# Patient Record
Sex: FEMALE
[De-identification: names, ages, dates, MRNs, and addresses within clinical notes are randomized per-mention and may not be internally consistent; named-entity substitution may affect disease eponyms.]

---

## 2022-08-17 ENCOUNTER — NON-APPOINTMENT (OUTPATIENT)
Age: 32
End: 2022-08-17

## 2023-03-21 PROBLEM — Z00.00 ENCOUNTER FOR PREVENTIVE HEALTH EXAMINATION: Status: ACTIVE | Noted: 2023-03-21

## 2023-03-22 ENCOUNTER — APPOINTMENT (OUTPATIENT)
Dept: OTOLARYNGOLOGY | Facility: CLINIC | Age: 33
End: 2023-03-22
Payer: COMMERCIAL

## 2023-03-22 VITALS — WEIGHT: 170 LBS | HEIGHT: 66 IN | BODY MASS INDEX: 27.32 KG/M2

## 2023-03-22 DIAGNOSIS — Z78.9 OTHER SPECIFIED HEALTH STATUS: ICD-10-CM

## 2023-03-22 DIAGNOSIS — Z83.3 FAMILY HISTORY OF DIABETES MELLITUS: ICD-10-CM

## 2023-03-22 DIAGNOSIS — Z82.49 FAMILY HISTORY OF ISCHEMIC HEART DISEASE AND OTHER DISEASES OF THE CIRCULATORY SYSTEM: ICD-10-CM

## 2023-03-22 DIAGNOSIS — Z82.5 FAMILY HISTORY OF ASTHMA AND OTHER CHRONIC LOWER RESPIRATORY DISEASES: ICD-10-CM

## 2023-03-22 DIAGNOSIS — Z87.09 PERSONAL HISTORY OF OTHER DISEASES OF THE RESPIRATORY SYSTEM: ICD-10-CM

## 2023-03-22 PROCEDURE — 31231 NASAL ENDOSCOPY DX: CPT

## 2023-03-22 PROCEDURE — 99204 OFFICE O/P NEW MOD 45 MIN: CPT | Mod: 25

## 2023-03-22 RX ORDER — BUDESONIDE 1 MG/2ML
INHALANT ORAL
Refills: 0 | Status: ACTIVE | COMMUNITY

## 2023-03-22 RX ORDER — GENTAMICIN SULFATE 3 MG/ML
SOLUTION OPHTHALMIC
Refills: 0 | Status: ACTIVE | COMMUNITY

## 2023-03-22 NOTE — CONSULT LETTER
[Dear  ___] : Dear  [unfilled], [Consult Letter:] : I had the pleasure of evaluating your patient, [unfilled]. [Please see my note below.] : Please see my note below. [Consult Closing:] : Thank you very much for allowing me to participate in the care of this patient.  If you have any questions, please do not hesitate to contact me. [Sincerely,] : Sincerely, [FreeTextEntry3] : Izzy Spears MD\par [facility]\par Otolaryngology – Head & Neck Surgery\par [facility]\par \par \par Department of Otolaryngology\par [facility]\par \par Office Tel: [phone]\par

## 2023-03-22 NOTE — HISTORY OF PRESENT ILLNESS
[de-identified] : ELE MONTESINOS is a 32 year old patient with a history of chronic sinusitis and sinus surgery.  She had sinus surgery in January 2021.  She had nasal polyps and allergic fungal sinusitis.  She did well for a while after surgery but then had recurrent symptoms this past summer.  She had been doing her topical rinses less frequently.  She does feel better she takes antibiotics and steroids.  She just finished a course of steroids which helped again.  She has nasal congestion, nasal obstruction, nasal drainage.  She does not have sinus pain or pressure.  She had been seeing [facility] her [facility] disease.  They had discussed revision sinus surgery.  She had a CT scan in March 2023 which we reviewed.  She is on nasal saline irrigations with gentamicin and budesonide.  She said recent culture grew Klebsiella\par \par She has a history of allergies.  Immunotherapy was recommended.  She is going to start this.  She was also evaluated for Dupixent.\par She had endoscopic sinus surgery in January 2021\par She does not smoke\par She does not have reflux\par Sleep study- April 2021.  BLUE was 1.4.  Her lowest oxygen saturation was 92%\par \par CT 3/14/23- prior sinus surgery. large maxillary sinus cyst/polyps with mucosal disease,opacification of the posterior ethmoids and sphenoid sinuses, obstruction of the SER and sphenoid sinus ostia. DNS\par \par her  CT scans and prior records were reviewed.

## 2023-03-22 NOTE — ASSESSMENT
[FreeTextEntry1] : History of chronic sinusitis, allergies, nasal polyps, sinus surgery.  She has had recurrent symptoms.  On exam she has large cyst/polyp in the maxillary sinuses with allergic mucin, obstruction and inflammation of the posterior ethmoid sinuses and sphenoethmoidal recesses, and a deviated nasal septum.  She is undergoing evaluation for immunotherapy and Dupixent.  She is a candidate for revision endoscopic sinus surgery.\par \par Plan\par -Findings and management options were discussed with the patient.\par -Continue nasal saline irrigations with budesonide gentamicin\par -Continue allergy work-up.  She is considering Dupixent\par -We discussed revision surgery.  She has received excellent care with Dr. Cho.  However, due to insurance issues, she is unable to have surgery with him.  She could consider trying Dupixent first.  However, she would benefit from revision endoscopic image guided sinus surgery with nasal polypectomy (bilateral maxillary sinuses, posterior ethmoid sinuses, and sphenoid sinuses), possible septoplasty and bilateral inferior turbinate reduction.  She would like to proceed.  I am going to have her speak with my surgery scheduler about arranging it.  I will review the procedure with her again prior to surgery as well as the risk/benefits.  She is aware of the risk that she has had sinus surgery in the past\par -I will have her speak with my surgery scheduler about arranging procedure.  I will see her back prior to surgery if she has any concerns or worsening symptoms

## 2023-06-30 ENCOUNTER — APPOINTMENT (OUTPATIENT)
Dept: OTOLARYNGOLOGY | Facility: CLINIC | Age: 33
End: 2023-06-30
Payer: COMMERCIAL

## 2023-06-30 DIAGNOSIS — Z91.09 OTHER ALLERGY STATUS, OTHER THAN TO DRUGS AND BIOLOGICAL SUBSTANCES: ICD-10-CM

## 2023-06-30 DIAGNOSIS — J33.9 NASAL POLYP, UNSPECIFIED: ICD-10-CM

## 2023-06-30 DIAGNOSIS — J32.4 CHRONIC PANSINUSITIS: ICD-10-CM

## 2023-06-30 DIAGNOSIS — J31.0 CHRONIC RHINITIS: ICD-10-CM

## 2023-06-30 DIAGNOSIS — J34.2 DEVIATED NASAL SEPTUM: ICD-10-CM

## 2023-06-30 PROCEDURE — 99213 OFFICE O/P EST LOW 20 MIN: CPT | Mod: 25

## 2023-06-30 PROCEDURE — 31231 NASAL ENDOSCOPY DX: CPT

## 2023-06-30 NOTE — CONSULT LETTER
[Dear  ___] : Dear  [unfilled], [Courtesy Letter:] : I had the pleasure of seeing your patient, [unfilled], in my office today. [Please see my note below.] : Please see my note below. [Consult Closing:] : Thank you very much for allowing me to participate in the care of this patient.  If you have any questions, please do not hesitate to contact me. [Sincerely,] : Sincerely, [FreeTextEntry3] : Izzy Spears MD\par [facility]\par Otolaryngology – Head & Neck Surgery\par [facility]\par \par \par Department of Otolaryngology\par [facility]\par \par Office Tel: [phone]\par

## 2023-06-30 NOTE — HISTORY OF PRESENT ILLNESS
[de-identified] : Is a historyNICOLE YAMEL is a 32 year old patient here for follow-up chronic rhinosinusitis, nasal polyps, and sinus surgery.  She is scheduled for revision surgery next week.  However, since her last visit, she has started Dupixent and feels a lot better.  She is breathing a lot better and has only occasional sinus pressure.  She has not had drainage.  She is also using the nasal saline irrigations with topical steroids.\par \par ENT history\par She has a history of allergies. Immunotherapy was recommended. She is going to start this. She was also evaluated for Dupixent.\par She had endoscopic sinus surgery in January 2021\par She does not smoke\par She does not have reflux\par Sleep study- April 2021. BLUE was 1.4. Her lowest oxygen saturation was 92%\par \par CT 3/14/23- prior sinus surgery. large maxillary sinus cyst/polyps with mucosal disease,opacification of the posterior ethmoids and sphenoid sinuses, obstruction of the SER and sphenoid sinus ostia. DNS

## 2023-06-30 NOTE — ASSESSMENT
[FreeTextEntry1] : She has a history of chronic sinusitis, allergies, nasal polyps, sinus surgery.  She has started Dupixent and is feeling better.  Her exam shows significant improvement.  There were no polyps or drainage on exam.  The posterior ethmoid sinuses looked a lot better.\par \par Plan\par -Findings and management options were discussed with the patient.\par -Continue nasal saline irrigations with budesonide bid\par -Continue allergy management and Dupixent\par -We discussed proceeding with the revision endoscopic sinus surgery with possible septoplasty and inferior turbinate submucous resection.  I reviewed in detail the risk/benefits of the procedure.  However, she is feeling better and exam does show improvement.  The posterior ethmoid sinuses had some scarring but were otherwise clear.  It is possible she should still have sphenoid sinus disease.  At this point, she is going to hold off on surgery.  I am going to see her back in approximately 1 month.  I will see how she is feeling.  We will discuss doing a follow-up CT scan.  We could also consider in office procedures if needed.\par -She should call me and return earlier if any concerns or worsening symptoms

## 2023-06-30 NOTE — REASON FOR VISIT
[Subsequent Evaluation] : a subsequent evaluation for [FreeTextEntry2] : nasal polyps and chronic sinusitis

## 2023-07-05 ENCOUNTER — APPOINTMENT (OUTPATIENT)
Age: 33
End: 2023-07-05

## 2023-08-04 ENCOUNTER — APPOINTMENT (OUTPATIENT)
Dept: PHYSICAL MEDICINE AND REHAB | Facility: CLINIC | Age: 33
End: 2023-08-04

## 2023-08-15 ENCOUNTER — APPOINTMENT (OUTPATIENT)
Dept: PHYSICAL MEDICINE AND REHAB | Facility: CLINIC | Age: 33
End: 2023-08-15
Payer: COMMERCIAL

## 2023-08-15 VITALS
HEART RATE: 77 BPM | RESPIRATION RATE: 18 BRPM | DIASTOLIC BLOOD PRESSURE: 67 MMHG | BODY MASS INDEX: 28.45 KG/M2 | WEIGHT: 177 LBS | SYSTOLIC BLOOD PRESSURE: 112 MMHG | HEIGHT: 66 IN

## 2023-08-15 PROCEDURE — 99204 OFFICE O/P NEW MOD 45 MIN: CPT

## 2023-08-15 NOTE — REVIEW OF SYSTEMS
[Patient Intake Form Reviewed] : Patient intake form was reviewed [Fever] : no fever [Chills] : no chills [Lower Ext Edema] : no lower extremity edema [Joint Pain] : joint pain [Joint Stiffness] : joint stiffness [Muscle Pain] : muscle pain

## 2023-08-15 NOTE — PHYSICAL EXAM
[FreeTextEntry1] : PHYSICAL EXAM : OBJECTIVE   GENERAL : Awake ,alert and oriented to time place and person  HEAD : normocephalic and atraumatic  NECK : supple ,no tracheal deviation ,no thyroid enlargement noted with swallowing EYES : sclera and conjunctiva normal no redness,intact extraocular movements  ENT  : ears and nose normal in appearance -hearing adequate  PULMONARY: effort normal. No respiratory distress. breathing regular. No wheezes  LYMPH : No swelling in limbs, capillary return within normal range  CVS : warm extremities,no palpitations,not short of breath, no visible jugular venous distention PSYCH : mood and affect normal ,good eye contact ,normal attention  ABDOMEN : no visible distension ,  NEUROLOGICAL:cranial nerves intact,muscle tone normal,gait and balance safe except where noted below  SKIN : warm and dry No rash detected over specific body areas examined  MUSCULOSKELETAL: normal muscle bulk, no focal bony tenderness /posture normal except where specified below + tinels R elbow  weak PAD and DAB R  numb pinky R  no change in ZAIRE sensation

## 2023-08-15 NOTE — CONSULT LETTER
[FreeTextEntry1] : Dear Dr. LORENA AVERY  ,   I had the pleasure of evaluating your patient, ELE MONTESINOS .  Thank you very much for allowing me to participate in the care of this patient. If you have any questions, please do not hesitate to contact me.   Sincerely,  Greyson Donato MD   ABPMR Board Certified in Physical Medicine and Rehabilitation Certified Fellow of AANEM (Neuromuscular and Electrodiagnostic Medicine) Subspecialty certified in Sports Medicine (ABPMR)   of Physical Medicine and Rehabilitation [facility] [employer] [facility]

## 2023-08-15 NOTE — ASSESSMENT
[FreeTextEntry1] :  PLAN AND RECOMMENDATIONS :  We discussed differential diagnosis and clinical impression agree with EMG rule out cubital tunnel   Recommend .symptomatic care and support  medications   imaging   referral to  hydrotherapy /heat / cold for pain  continue  relative rest and avoidance of painful activity where possible   increasing activity as discussed   return for follow up

## 2023-08-15 NOTE — HISTORY OF PRESENT ILLNESS
[FreeTextEntry1] : Ms. ELE MONTESINOS is a very pleasant 33 year female RHD  who seen for evaluation of R 4 th 5 th digit numbness and pain that has been ongoing for a couple of years since Covid  without any specific injury or inciting event. The pain is located primarily medial hand as well as elbow  intermittent in nature and described as dull . The pain is rated as 5/10 during today's visit, and ranges from 3-8/10. The patient's symptoms are aggravated by using hand using computer using phone or leaning on arm and alleviated by rest sleeping with elbow brace . The patient denies any night pain, LUE numbness/tingling, weakness, or bowel/bladder dysfunction. The patient has no other complaints at this time. 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

## 2023-08-24 ENCOUNTER — APPOINTMENT (OUTPATIENT)
Dept: PHYSICAL MEDICINE AND REHAB | Facility: CLINIC | Age: 33
End: 2023-08-24
Payer: COMMERCIAL

## 2023-08-24 DIAGNOSIS — G56.21 LESION OF ULNAR NERVE, RIGHT UPPER LIMB: ICD-10-CM

## 2023-08-24 DIAGNOSIS — R20.0 ANESTHESIA OF SKIN: ICD-10-CM

## 2023-08-24 PROCEDURE — 95913 NRV CNDJ TEST 13/> STUDIES: CPT

## 2023-08-24 PROCEDURE — 95886 MUSC TEST DONE W/N TEST COMP: CPT

## 2023-10-11 ENCOUNTER — APPOINTMENT (OUTPATIENT)
Dept: OBGYN | Facility: CLINIC | Age: 33
End: 2023-10-11
Payer: COMMERCIAL

## 2023-10-11 VITALS
SYSTOLIC BLOOD PRESSURE: 133 MMHG | DIASTOLIC BLOOD PRESSURE: 93 MMHG | OXYGEN SATURATION: 98 % | HEIGHT: 66 IN | WEIGHT: 175 LBS | BODY MASS INDEX: 28.12 KG/M2 | HEART RATE: 90 BPM

## 2023-10-11 DIAGNOSIS — Z01.419 ENCOUNTER FOR GYNECOLOGICAL EXAMINATION (GENERAL) (ROUTINE) W/OUT ABNORMAL FINDINGS: ICD-10-CM

## 2023-10-11 DIAGNOSIS — Z11.3 ENCOUNTER FOR SCREENING FOR INFECTIONS WITH A PREDOMINANTLY SEXUAL MODE OF TRANSMISSION: ICD-10-CM

## 2023-10-11 PROCEDURE — 36415 COLL VENOUS BLD VENIPUNCTURE: CPT

## 2023-10-11 PROCEDURE — 99385 PREV VISIT NEW AGE 18-39: CPT

## 2023-10-12 LAB
C TRACH RRNA SPEC QL NAA+PROBE: NOT DETECTED
HBV SURFACE AG SER QL: NONREACTIVE
HIV1+2 AB SPEC QL IA.RAPID: NONREACTIVE
HPV HIGH+LOW RISK DNA PNL CVX: NOT DETECTED
N GONORRHOEA RRNA SPEC QL NAA+PROBE: NOT DETECTED
SOURCE TP AMPLIFICATION: NORMAL

## 2023-10-13 ENCOUNTER — TRANSCRIPTION ENCOUNTER (OUTPATIENT)
Age: 33
End: 2023-10-13

## 2023-10-13 LAB
BACTERIA UR CULT: NORMAL
T PALLIDUM AB SER QL IA: NEGATIVE

## 2023-10-16 LAB
HSV 1 IGG TYPE-SPECIFIC AB: 57.3 INDEX
HSV 2 IGG TYPE-SPECIFIC AB: <0.9 INDEX

## 2023-10-17 LAB — CYTOLOGY CVX/VAG DOC THIN PREP: NORMAL

## 2023-10-23 ENCOUNTER — APPOINTMENT (OUTPATIENT)
Dept: OBGYN | Facility: CLINIC | Age: 33
End: 2023-10-23

## 2023-11-09 ENCOUNTER — APPOINTMENT (OUTPATIENT)
Dept: GASTROENTEROLOGY | Facility: CLINIC | Age: 33
End: 2023-11-09
Payer: COMMERCIAL

## 2023-11-09 VITALS
RESPIRATION RATE: 16 BRPM | DIASTOLIC BLOOD PRESSURE: 70 MMHG | BODY MASS INDEX: 27.97 KG/M2 | TEMPERATURE: 96.8 F | HEIGHT: 66 IN | HEART RATE: 80 BPM | WEIGHT: 174 LBS | OXYGEN SATURATION: 97 % | SYSTOLIC BLOOD PRESSURE: 110 MMHG

## 2023-11-09 DIAGNOSIS — K59.00 CONSTIPATION, UNSPECIFIED: ICD-10-CM

## 2023-11-09 DIAGNOSIS — Z23 ENCOUNTER FOR IMMUNIZATION: ICD-10-CM

## 2023-11-09 PROCEDURE — 90686 IIV4 VACC NO PRSV 0.5 ML IM: CPT

## 2023-11-09 PROCEDURE — 99204 OFFICE O/P NEW MOD 45 MIN: CPT | Mod: 25

## 2023-11-09 PROCEDURE — G0008: CPT

## 2023-12-07 ENCOUNTER — NON-APPOINTMENT (OUTPATIENT)
Age: 33
End: 2023-12-07

## 2023-12-07 ENCOUNTER — APPOINTMENT (OUTPATIENT)
Dept: COLORECTAL SURGERY | Facility: CLINIC | Age: 33
End: 2023-12-07

## 2023-12-12 ENCOUNTER — NON-APPOINTMENT (OUTPATIENT)
Age: 33
End: 2023-12-12

## 2023-12-13 ENCOUNTER — APPOINTMENT (OUTPATIENT)
Dept: DERMATOLOGY | Facility: CLINIC | Age: 33
End: 2023-12-13
Payer: COMMERCIAL

## 2023-12-13 DIAGNOSIS — L82.1 OTHER SEBORRHEIC KERATOSIS: ICD-10-CM

## 2023-12-13 DIAGNOSIS — Q82.8 OTHER SPECIFIED CONGENITAL MALFORMATIONS OF SKIN: ICD-10-CM

## 2023-12-13 DIAGNOSIS — L60.8 OTHER NAIL DISORDERS: ICD-10-CM

## 2023-12-13 PROCEDURE — 99204 OFFICE O/P NEW MOD 45 MIN: CPT

## 2023-12-13 NOTE — HISTORY OF PRESENT ILLNESS
[FreeTextEntry1] : NPV- KP, DPN, Dark strip on nail [de-identified] : Debora Sheriff is a 32yo F presenting for keratosis pilaris and dermatosis papulosa nigra.

## 2023-12-13 NOTE — HISTORY OF PRESENT ILLNESS
[FreeTextEntry1] : NPV- KP, DPN, Dark strip on nail [de-identified] : Debora Sheriff is a 34yo F presenting for keratosis pilaris and dermatosis papulosa nigra.

## 2023-12-13 NOTE — PHYSICAL EXAM
[FreeTextEntry3] : - stuck on appearing keratotic hyperpigmented papules on cheeks and temples - 5mm greyish-brown band of melanonychia (about 30-35% of nail plate). Pigmentation is fairly even. Grey dots granules are not seen. negative Villalba sign (she has periungual hyperpigmentation in all nails). No triangle sign.  - follicular based papules in oval configurations on shins and right posterior upper arm.

## 2023-12-13 NOTE — ASSESSMENT
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 Initially grew, reportedly stable for 10 years.    Based on clinical presentation and dermoscopy findings, melanoma specific signs were not seen. Although the history is reassuring that she has not noticed growth or change in 10 years, given the size approaching 40% of the nail plate, I recommended a nail matrix biopsy to r/o subungual melanoma. We discussed the surgical risks and benefits of performing a nail biopsy.  She would like to avoid doing the nail biopsy if possible. We discussed that the next best option would be to take photos and measurements and follow the lesion closely with regular follow up. She is in agreement with this plan. She is aware to contact the clinic for follow up if she were to notice any changes such as growth, changes in color, new pigmentation involving the skin of the proximal nailfold, or new nail plate dystrophy.   - f/u 12 weeks  #Dermatosis papulosa nigra Patient was reassured of the benign nature of these findings. No further treatment needed at this time. If any lesion changes or becomes symptomatic I recommend follow-up  # Lichen spinulosus- shins, R upper arm Clinically, favor LS, given the round circular configurations. Other ddx: keratosis pilaris, lichen nitidus, less likely follicular MF.  Discussed that lichen spinulosus is a benign cutaneous disorder characterized by sudden onset of localized circumscribed plaques composed of follicular hyperkeratotic papules. This conditions can self-resolve. Can be triggered by skin abrasion and I suspect it may be exacerbated by the regular dermabrasions she is receiving.   -I recommended gentle skin care, with bland fragrance-free moisturizer -avoid mechanical trauma and exfoliation -start a gentle chemical exfoliator such as Amlactin or Cerave SA cream daily to the area -We discussed that if these lesions spread and become more widespread, she should follow up with me in clinic for a biopsy to r/o other less common but serious skin conditions.

## 2023-12-31 PROBLEM — Z11.3 ENCOUNTER FOR SCREENING EXAMINATION FOR SEXUALLY TRANSMITTED DISEASE: Status: ACTIVE | Noted: 2023-10-11

## 2024-01-01 NOTE — PROCEDURE
[de-identified] : EMG /NERVE CONDUCTION STUDY performed today without complication  Tabulated data, wave forms , conclusions and recommendations are attached and in the procedure report.  Please refer to the scanned study attached to this encounter  Full history and focused clinical exam performed prior to the examination and documented in report Offered, feeding was successful

## 2024-01-18 ENCOUNTER — APPOINTMENT (OUTPATIENT)
Dept: COLORECTAL SURGERY | Facility: CLINIC | Age: 34
End: 2024-01-18
Payer: COMMERCIAL

## 2024-01-18 VITALS
WEIGHT: 177 LBS | SYSTOLIC BLOOD PRESSURE: 134 MMHG | HEIGHT: 66 IN | DIASTOLIC BLOOD PRESSURE: 79 MMHG | TEMPERATURE: 97.7 F | BODY MASS INDEX: 28.45 KG/M2 | HEART RATE: 90 BPM

## 2024-01-18 DIAGNOSIS — Z82.3 FAMILY HISTORY OF STROKE: ICD-10-CM

## 2024-01-18 DIAGNOSIS — K64.4 RESIDUAL HEMORRHOIDAL SKIN TAGS: ICD-10-CM

## 2024-01-18 DIAGNOSIS — Z82.49 FAMILY HISTORY OF ISCHEMIC HEART DISEASE AND OTHER DISEASES OF THE CIRCULATORY SYSTEM: ICD-10-CM

## 2024-01-18 PROCEDURE — 99203 OFFICE O/P NEW LOW 30 MIN: CPT | Mod: 25

## 2024-01-18 PROCEDURE — 46600 DIAGNOSTIC ANOSCOPY SPX: CPT

## 2024-01-18 RX ORDER — DUPILUMAB 300 MG/2ML
300 INJECTION, SOLUTION SUBCUTANEOUS
Refills: 0 | Status: ACTIVE | COMMUNITY

## 2024-01-18 NOTE — ASSESSMENT
[FreeTextEntry1] : Exam findings and diagnosis were discussed at length with patient.  Management options discussed, including supportive care. Witch hazel and hydrocortisone as needed for symptoms were discussed. We discussed excision as an alternative. The nature of the procedure as well as risks and benefits were reviewed with the patient. Postprocedural expectations regarding pain, wound cosmesis, and wound healing was discussed.   All questions were answered, patient expressed understanding. She will continue with supportive care and monitor for symptoms, may consider excision if symptoms develop.

## 2024-01-18 NOTE — HISTORY OF PRESENT ILLNESS
[FreeTextEntry1] : 32 yo F presents for initial evaluation of skin tag Referred by [facility]DELKA [facility] nasal polyps Denies h/o pregnancy Medications: Dupixent PSH B/L nasal polyp removal, hysteroscopic myomectomy c/b need for blood transfusion  Never had colonoscopy  Per chart review, seen by YUDELKA Mckenzie 11/8/2023 for initial consultation c/o external hemorrhoid. Reports h/o constipation while on Percocet after 2021 sinus surgery. Pt strained and noted blood in toilet bowl and on TP and associated w/ pain w/ every BM. Denies bleeding or pain at visit, but c/o skin tag/possible external hemorrhoid. 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  Exam noted small, non bleeding tag on posterior aspect of anal opening. Recommend optimization of bowel habits w/ dietary fiber, supplementary fiber and water intake. Referred to this office for further evaluation of tag  Since development of external tissue may notice scant BRB on TP after slightly harder stool or slight irritation when wiping after BM. Admits tissue is difficult to clean and has to use water and TP. Denies stool streaking. Denies itching or burning.  Also reports h/o incomplete emptying for many years. May have to pass stool again 1 day later or sometimes presses against perineum to assist in evacuating stool.   Of note, recently had loose stool while in [place], was eating lots of dietary fiber, then had diarrhea on trip home x a few days. Denies fever, chills, bloody stools. Returned on 1/12 and stools have become formed.  BH: typically every other day Previously on fiber supplement. Limited water intake while at work Denies ASA/NSAID use

## 2024-01-18 NOTE — PHYSICAL EXAM
[FreeTextEntry1] : Medical assistant present for duration of physical examination   General no acute distress, alert and oriented Psych calm, pleasant demeanor, responding appropriately to questions Nonlabored breathing Ambulating without assistance Skin normal color and pigment, no visible lesions or rashes    Anorectal Exam: Inspection no erythema, induration or fluctuance, no skin excoriation, no fissure, small residual anal skin tag anterior midline anal verge JESSICA nontender, no masses palpated, no blood on gloved finger   Procedure: Anoscopy   Pre procedure Diagnosis: anal skin tag Post procedure Diagnosis: anal skin tag Anesthesia: none Estimated blood loss: none Specimen: none Complications: none   Consent obtained. Anoscopy was performed by passing a lighted anoscope with lubricant jelly into the anal canal and the entire anal mucosal surface was inspected. Findings included no fissure, no internal hemorrhoidal inflammation, no visible masses or lesions   Patient tolerated examination and procedure well.

## 2024-02-27 ENCOUNTER — APPOINTMENT (OUTPATIENT)
Dept: PHYSICAL MEDICINE AND REHAB | Facility: CLINIC | Age: 34
End: 2024-02-27

## 2024-03-12 ENCOUNTER — APPOINTMENT (OUTPATIENT)
Dept: DERMATOLOGY | Facility: CLINIC | Age: 34
End: 2024-03-12

## 2024-03-19 ENCOUNTER — APPOINTMENT (OUTPATIENT)
Dept: PHYSICAL MEDICINE AND REHAB | Facility: CLINIC | Age: 34
End: 2024-03-19
Payer: COMMERCIAL

## 2024-03-19 VITALS — WEIGHT: 177 LBS | RESPIRATION RATE: 18 BRPM | HEIGHT: 66 IN | BODY MASS INDEX: 28.45 KG/M2

## 2024-03-19 DIAGNOSIS — R27.8 OTHER LACK OF COORDINATION: ICD-10-CM

## 2024-03-19 DIAGNOSIS — S66.919A STRAIN OF UNSPECIFIED MUSCLE, FASCIA AND TENDON AT WRIST AND HAND LEVEL, UNSPECIFIED HAND, INITIAL ENCOUNTER: ICD-10-CM

## 2024-03-19 DIAGNOSIS — S46.911D STRAIN OF UNSPECIFIED MUSCLE, FASCIA AND TENDON AT SHOULDER AND UPPER ARM LEVEL, RIGHT ARM, SUBSEQUENT ENCOUNTER: ICD-10-CM
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
## 2024-03-19 DIAGNOSIS — G56.21 LESION OF ULNAR NERVE, RIGHT UPPER LIMB: ICD-10-CM

## 2024-03-19 DIAGNOSIS — R29.898 OTHER SYMPTOMS AND SIGNS INVOLVING THE MUSCULOSKELETAL SYSTEM: ICD-10-CM
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
## 2024-03-19 DIAGNOSIS — R20.2 PARESTHESIA OF SKIN: ICD-10-CM

## 2024-03-19 PROCEDURE — 99214 OFFICE O/P EST MOD 30 MIN: CPT

## 2024-03-19 PROCEDURE — G2211 COMPLEX E/M VISIT ADD ON: CPT

## 2024-03-19 RX ORDER — NAPROXEN 500 MG/1
500 TABLET ORAL TWICE DAILY
Qty: 60 | Refills: 0 | Status: ACTIVE | COMMUNITY
Start: 2024-03-19 | End: 1900-01-01

## 2024-03-19 NOTE — HISTORY OF PRESENT ILLNESS
[FreeTextEntry1] : This visit was provided via telehealth using real-time 2-way audio visual technology. The patient, Ms. ELE MONTESINOS , was located at home, [address] [place] [address] , at the time of the visit. The provider, DONAVAN MCCURDY, was located at the medical office located in [address] [place] [address] at the time of the visit. The patient, ELE MONTESINOS and Provider participated in the telehealth encounter. Verbal consent given on 03/19/2024 , by the patient ELE MONTESINOS .  Ms. ELE MONTESINOS is a very pleasant 33 year female RHD who seen for follow up visit of R 4 th 5 th digit numbness and pain that has been ongoing for a couple of years since Covid without any specific injury or inciting event. The pain is located primarily medial hand as well as elbow intermittent in nature and described as dull . The pain is rated as 5/10 during today's visit, and ranges from 3-8/10. The patient's symptoms are aggravated by using hand using computer using phone or leaning on arm and alleviated by rest sleeping with elbow brace . The patient denies any night pain, LUE numbness/tingling, weakness, or bowel/bladder dysfunction. The patient has no other complaints at this time. 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

## 2024-03-19 NOTE — ASSESSMENT
[FreeTextEntry1] :  PLAN AND RECOMMENDATIONS :  We discussed differential diagnosis and clinical impression she needs a new PT hand script other ran out and her ins changed  she only had one session   Recommend .symptomatic care and support  medications advise prescription NSAIDS for both pain and anti inflammation  to help with healing / calming the soft tissue and reducing swelling- for at least 2 weeks strict -Naprosyn 500 mg BID after food -warned of possible GI side effects -advised to take with meals or add over the counter Nexium, if sensitive- if GI upset (nausea, vomiting, heartburn) becomes noticeable -stop medication and notify the office.   can take nsaid prn bad days   imaging done  referral to rehab   hydrotherapy /heat / cold for pain  continue  ergonomic precautions including pacing ,posture and frequent breaks while typing.   relative rest and avoidance of painful activity where possible   increasing activity as discussed   return for follow up prn 8 weeks

## 2024-03-19 NOTE — REVIEW OF SYSTEMS
[Muscle Weakness] : muscle weakness [Patient Intake Form Reviewed] : Patient intake form was reviewed [Fever] : no fever [Chills] : no chills [Recent Change In Weight] : ~T no recent weight change [Joint Pain] : joint pain

## 2024-03-19 NOTE — HISTORY OF PRESENT ILLNESS
[FreeTextEntry1] : This visit was provided via telehealth using real-time 2-way audio visual technology. The patient, Ms. ELE MONTESINOS , was located at home, [address] [place] [address] , at the time of the visit. The provider, DONAVAN MCCURDY, was located at the medical office located in [address] [place] [address] at the time of the visit. The patient, ELE MONTESINOS and Provider participated in the telehealth encounter. Verbal consent given on 03/19/2024 , by the patient ELE MONTESINOS .  Ms. ELE MONTESINOS is a very pleasant 33 year female RHD who seen for follow up visit of R 4 th 5 th digit numbness and pain that has been ongoing for a couple of years since Covid without any specific injury or inciting event. The pain is located primarily medial hand as well as elbow intermittent in nature and described as dull . The pain is rated as 5/10 during today's visit, and ranges from 3-8/10. The patient's symptoms are aggravated by using hand using computer using phone or leaning on arm and alleviated by rest sleeping with elbow brace . The patient denies any night pain, LUE numbness/tingling, weakness, or bowel/bladder dysfunction. The patient has no other complaints at this time. 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